# Patient Record
Sex: FEMALE | Race: BLACK OR AFRICAN AMERICAN | URBAN - METROPOLITAN AREA
[De-identification: names, ages, dates, MRNs, and addresses within clinical notes are randomized per-mention and may not be internally consistent; named-entity substitution may affect disease eponyms.]

---

## 2023-05-11 ENCOUNTER — OFFICE VISIT (OUTPATIENT)
Dept: OBGYN CLINIC | Age: 18
End: 2023-05-11

## 2023-05-11 VITALS
DIASTOLIC BLOOD PRESSURE: 80 MMHG | WEIGHT: 293 LBS | SYSTOLIC BLOOD PRESSURE: 132 MMHG | BODY MASS INDEX: 39.68 KG/M2 | HEIGHT: 72 IN

## 2023-05-11 DIAGNOSIS — L83 ACANTHOSIS NIGRICANS: ICD-10-CM

## 2023-05-11 DIAGNOSIS — L68.0 HIRSUTISM: ICD-10-CM

## 2023-05-11 DIAGNOSIS — N91.3 PRIMARY OLIGOMENORRHEA: ICD-10-CM

## 2023-05-11 DIAGNOSIS — N91.3 PRIMARY OLIGOMENORRHEA: Primary | ICD-10-CM

## 2023-05-11 DIAGNOSIS — N93.9 ABNORMAL UTERINE BLEEDING (AUB): ICD-10-CM

## 2023-05-11 LAB
PROLACTIN SERPL-MCNC: 16.6 NG/ML
TSH W FREE THYROID IF ABNORMAL: 2.22 UIU/ML (ref 0.36–3.74)

## 2023-05-11 PROCEDURE — 99204 OFFICE O/P NEW MOD 45 MIN: CPT | Performed by: OBSTETRICS & GYNECOLOGY

## 2023-05-11 ASSESSMENT — PATIENT HEALTH QUESTIONNAIRE - PHQ9
SUM OF ALL RESPONSES TO PHQ QUESTIONS 1-9: 0
2. FEELING DOWN, DEPRESSED OR HOPELESS: 0
SUM OF ALL RESPONSES TO PHQ QUESTIONS 1-9: 0
1. LITTLE INTEREST OR PLEASURE IN DOING THINGS: 0
SUM OF ALL RESPONSES TO PHQ9 QUESTIONS 1 & 2: 0

## 2023-05-11 NOTE — PROGRESS NOTES
[facility]  [address], [address], [address] [facility] [place]    Ana Quiroz MD, Angely Freitas MD, FACOG  BEN Berrios-BC  Assessment/Plan     Inder Puentes was seen today for new patient and consultation. Diagnoses and all orders for this visit:    Primary oligomenorrhea  Comments:  - possible she has PCOS but she is now on treatment, harder to check all labs as FSH/E2 impacted by OCPs  - discussed criteria and implication of PCOS  - will check appropriate labs and do US. Discussed recommendations for a pelvic US. Orders:  -     Prolactin; Future  -     TSH with Reflex; Future  -     Testosterone, free, total; Future  -     DHEA-Sulfate; Future  -     17-OH Progesterone, LC/MS; Future  -     desogestrel-ethinyl estradiol (APRI) 0.15-30 MG-MCG per tablet; Take 1 tablet by mouth daily    Hirsutism  Comments:  - check androgen levels  -may be related to PCOS  Orders:  -     Testosterone, free, total; Future  -     DHEA-Sulfate; Future  -     17-OH Progesterone, LC/MS; Future    Acanthosis nigricans  Comments:  - will check A1C to screen for DM  Orders:  -     Hemoglobin A1C; Future    Abnormal uterine bleeding (AUB)  Comments:  - currently prolonged bleeding with OCPs  - will change pill  - checking US and labs as above            Subjective     Kathryn Shields 21 y.o. Lisette Rom presents today for a gyn problem of who presents for concerns about possible PCOS. New patient. Med hx c/b BMI 37. She has been on Sprintec for 1.5 years. She has a grandmother with PCOS and has concerns she may also have PCOS. Has darkened skin on her neck and cheeks. Needs to wax her neck. Prior to OCPs would skip a period every couple of months, typically 9 a year. Periods were painful, would sometimes pass out. Has trouble loosing weight. No abnormal discharge. No breast complaints. Denies constipation/diarrhea or nipple discharge.  Lost 30 lbs 1 year ago and then tried to continue to loose weight

## 2023-05-11 NOTE — PROGRESS NOTES
New patient here for discuss PCOS concerns. Patient states she has been experiencing weight gains, possible hirsutism, darkening of the skin under the neck and behind the neck, as well as irregular periods. LAST PAP:  never     LAST MAMMO:  never     LMP:  Patient's last menstrual period was 04/29/2023 (exact date).     BIRTH CONTROL:  OCP (estrogen/progesterone)    TOBACCO USE:  No    FAMILY HISTORY OF:   Breast Cancer:  No   Ovarian Cancer:  No   Uterine Cancer:  No   Colon Cancer:  No    Vitals:    05/11/23 0959   Weight: 293 lb (132.9 kg)   Height: 6' 2\" (1.88 m)        Clementina Mike RN  05/11/23  10:06 AM

## 2023-05-12 LAB
EST. AVERAGE GLUCOSE BLD GHB EST-MCNC: 105 MG/DL
HBA1C MFR BLD: 5.3 % (ref 4.8–5.6)

## 2023-05-12 RX ORDER — DESOGESTREL AND ETHINYL ESTRADIOL 0.15-0.03
1 KIT ORAL DAILY
Qty: 1 PACKET | Refills: 3 | Status: SHIPPED | OUTPATIENT
Start: 2023-05-12

## 2023-05-13 LAB
DHEA-S SERPL-MCNC: 254 UG/DL (ref 110–433.2)
TESTOST FREE SERPL-MCNC: 2.6 PG/ML
TESTOST SERPL-MCNC: 69 NG/DL (ref 13–71)

## 2023-05-16 LAB — 17OHP SERPL-MCNC: 85 NG/DL

## 2023-06-20 ENCOUNTER — OFFICE VISIT (OUTPATIENT)
Dept: OBGYN CLINIC | Age: 18
End: 2023-06-20
Payer: COMMERCIAL

## 2023-06-20 VITALS
WEIGHT: 293 LBS | HEIGHT: 72 IN | BODY MASS INDEX: 39.68 KG/M2 | SYSTOLIC BLOOD PRESSURE: 124 MMHG | DIASTOLIC BLOOD PRESSURE: 82 MMHG

## 2023-06-20 DIAGNOSIS — N93.9 ABNORMAL UTERINE BLEEDING (AUB): ICD-10-CM

## 2023-06-20 DIAGNOSIS — L83 ACANTHOSIS NIGRICANS: ICD-10-CM

## 2023-06-20 DIAGNOSIS — N91.3 PRIMARY OLIGOMENORRHEA: ICD-10-CM

## 2023-06-20 DIAGNOSIS — E28.2 PCOS (POLYCYSTIC OVARIAN SYNDROME): Primary | ICD-10-CM

## 2023-06-20 DIAGNOSIS — L68.0 HIRSUTISM: ICD-10-CM

## 2023-06-20 DIAGNOSIS — Z30.41 SURVEILLANCE OF CONTRACEPTIVE PILL: ICD-10-CM

## 2023-06-20 PROCEDURE — 99214 OFFICE O/P EST MOD 30 MIN: CPT | Performed by: OBSTETRICS & GYNECOLOGY

## 2023-06-20 PROCEDURE — 76830 TRANSVAGINAL US NON-OB: CPT | Performed by: OBSTETRICS & GYNECOLOGY

## 2023-06-20 RX ORDER — DESOGESTREL AND ETHINYL ESTRADIOL 0.15-0.03
1 KIT ORAL DAILY
Qty: 1 PACKET | Refills: 11 | Status: SHIPPED | OUTPATIENT
Start: 2023-06-20

## 2023-06-20 SDOH — ECONOMIC STABILITY: FOOD INSECURITY: WITHIN THE PAST 12 MONTHS, YOU WORRIED THAT YOUR FOOD WOULD RUN OUT BEFORE YOU GOT MONEY TO BUY MORE.: NEVER TRUE

## 2023-06-20 SDOH — ECONOMIC STABILITY: HOUSING INSECURITY
IN THE LAST 12 MONTHS, WAS THERE A TIME WHEN YOU DID NOT HAVE A STEADY PLACE TO SLEEP OR SLEPT IN A SHELTER (INCLUDING NOW)?: NO

## 2023-06-20 SDOH — ECONOMIC STABILITY: FOOD INSECURITY: WITHIN THE PAST 12 MONTHS, THE FOOD YOU BOUGHT JUST DIDN'T LAST AND YOU DIDN'T HAVE MONEY TO GET MORE.: NEVER TRUE

## 2023-06-20 SDOH — ECONOMIC STABILITY: INCOME INSECURITY: HOW HARD IS IT FOR YOU TO PAY FOR THE VERY BASICS LIKE FOOD, HOUSING, MEDICAL CARE, AND HEATING?: NOT HARD AT ALL

## 2023-06-20 ASSESSMENT — PATIENT HEALTH QUESTIONNAIRE - PHQ9
2. FEELING DOWN, DEPRESSED OR HOPELESS: 0
SUM OF ALL RESPONSES TO PHQ QUESTIONS 1-9: 1
1. LITTLE INTEREST OR PLEASURE IN DOING THINGS: 1
SUM OF ALL RESPONSES TO PHQ QUESTIONS 1-9: 1
SUM OF ALL RESPONSES TO PHQ9 QUESTIONS 1 & 2: 1
SUM OF ALL RESPONSES TO PHQ QUESTIONS 1-9: 1
SUM OF ALL RESPONSES TO PHQ QUESTIONS 1-9: 1

## 2023-06-20 NOTE — PROGRESS NOTES
Patient here for gyn f/u with US for primary oligomenorrhea. LAST PAP:  never due to age     LAST MAMMO:  never due to age     LMP:  Patient's last menstrual period was 06/07/2023 (exact date).     BIRTH CONTROL:  OCP (estrogen/progesterone)    TOBACCO USE:  No    FAMILY HISTORY OF:   Breast Cancer:  No   Ovarian Cancer:  No   Uterine Cancer:  No   Colon Cancer:  No    Vitals:    06/20/23 0946   BP: 124/82   Site: Left Upper Arm   Position: Sitting   Weight: 294 lb (133.4 kg)   Height: 6' 2\" (1.88 m)        Gita Ramirez RN  06/20/23  9:52 AM
Formerly [employer], criminal justice degree. Social Determinants of Health     Financial Resource Strain: Low Risk     Difficulty of Paying Living Expenses: Not hard at all   Food Insecurity: No Food Insecurity    Worried About [address] in the Last Year: Never true    [facility] in the Last Year: Never true   Transportation Needs: Unknown    Lack of Transportation (Medical): Not on file    Lack of Transportation (Non-Medical):  No   Physical Activity: Not on file   Stress: Not on file   Social Connections: Not on file   Intimate Partner Violence: Not on file   Housing Stability: Unknown    Unable to Pay for Housing in the Last Year: Not on file    Number of Places Lived in the Last Year: Not on file    Unstable Housing in the Last Year: No           No Known Allergies      Review of Systems    Constitutional:  No fevers or chills    Neuro:  No headaches, seizure activity    HEENT: no visual changes, bleeding gums    CV: No chest pain or palpitations    Resp: No SOB or cough    Breast:  No masses, pain, D/C, bleeding    GI:  No nausea/vomiting/diarrhea/constipation    : No dysuria or hematuria    MS:  No back, joint pain    Gyn:  As per HPI    Psych:  No depression, anxiety      Objective       Vitals:    06/20/23 0946   BP: 124/82         Physical Exam    General:  well developed, well nourished, in no acute distress     Head:   normocephalic and atraumatic     Msk:   no deformity or scoliosis noted with normal posture and gait     Skin:   intact without lesions or rashes     Psych:  alert and cooperative; normal mood and affect; normal attention span and concentration        Roseline Caldwell MD   12:39 PM  06/20/23

## 2023-09-26 ENCOUNTER — TELEPHONE (OUTPATIENT)
Dept: OBGYN CLINIC | Age: 18
End: 2023-09-26

## 2023-09-26 NOTE — TELEPHONE ENCOUNTER
Dr. Dalila Vines,     Patient is making sure that lack of menses on [facility] is therapeutic for her.

## 2023-09-27 NOTE — TELEPHONE ENCOUNTER
Messaged patient via [employer], patient has not read message yet. Called patient, no answer, LVM with no details given.

## 2023-09-27 NOTE — TELEPHONE ENCOUNTER
As long as she is not missing pills and is sexually active (could potentially be pregnant) it is ok to not have a period when you are on birth control pills. The pill keeps the uterine lining thin so that it sometimes does not need to shed and therefore you dont have a period. If she is worried about pregnancy, she can take a home test or come in for a UPT.

## 2023-09-29 NOTE — TELEPHONE ENCOUNTER
Called patient, reviewed recommendations/education with patient. Patient verbalized understanding and stated that her menses actually came on today.

## 2024-06-23 DIAGNOSIS — E28.2 PCOS (POLYCYSTIC OVARIAN SYNDROME): ICD-10-CM

## 2024-06-23 DIAGNOSIS — N91.3 PRIMARY OLIGOMENORRHEA: ICD-10-CM

## 2024-06-23 DIAGNOSIS — Z30.41 SURVEILLANCE OF CONTRACEPTIVE PILL: ICD-10-CM

## 2024-06-24 RX ORDER — DESOGESTREL AND ETHINYL ESTRADIOL 0.15-0.03
1 KIT ORAL DAILY
Qty: 1 PACKET | Refills: 11 | OUTPATIENT
Start: 2024-06-24

## 2024-08-21 ENCOUNTER — OFFICE VISIT (OUTPATIENT)
Dept: OBGYN CLINIC | Age: 19
End: 2024-08-21
Payer: COMMERCIAL

## 2024-08-21 VITALS
WEIGHT: 293 LBS | BODY MASS INDEX: 39.68 KG/M2 | HEIGHT: 72 IN | SYSTOLIC BLOOD PRESSURE: 130 MMHG | DIASTOLIC BLOOD PRESSURE: 84 MMHG

## 2024-08-21 DIAGNOSIS — Z76.89 ENCOUNTER TO ESTABLISH CARE WITH NEW DOCTOR: ICD-10-CM

## 2024-08-21 DIAGNOSIS — Z30.41 SURVEILLANCE OF CONTRACEPTIVE PILL: ICD-10-CM

## 2024-08-21 DIAGNOSIS — Z11.3 ROUTINE SCREENING FOR STI (SEXUALLY TRANSMITTED INFECTION): ICD-10-CM

## 2024-08-21 DIAGNOSIS — Z01.411 ENCOUNTER FOR GYNECOLOGICAL EXAMINATION (GENERAL) (ROUTINE) WITH ABNORMAL FINDINGS: Primary | ICD-10-CM

## 2024-08-21 DIAGNOSIS — Z12.4 ENCOUNTER FOR PAPANICOLAOU SMEAR FOR CERVICAL CANCER SCREENING: ICD-10-CM

## 2024-08-21 DIAGNOSIS — E28.2 PCOS (POLYCYSTIC OVARIAN SYNDROME): ICD-10-CM

## 2024-08-21 PROCEDURE — 99459 PELVIC EXAMINATION: CPT | Performed by: OBSTETRICS & GYNECOLOGY

## 2024-08-21 PROCEDURE — 99395 PREV VISIT EST AGE 18-39: CPT | Performed by: OBSTETRICS & GYNECOLOGY

## 2024-08-21 RX ORDER — DESOGESTREL AND ETHINYL ESTRADIOL 0.15-0.03
1 KIT ORAL DAILY
Qty: 3 PACKET | Refills: 4 | Status: SHIPPED | OUTPATIENT
Start: 2024-08-21

## 2024-08-21 ASSESSMENT — PATIENT HEALTH QUESTIONNAIRE - PHQ9
2. FEELING DOWN, DEPRESSED OR HOPELESS: NOT AT ALL
SUM OF ALL RESPONSES TO PHQ QUESTIONS 1-9: 0
SUM OF ALL RESPONSES TO PHQ9 QUESTIONS 1 & 2: 0
SUM OF ALL RESPONSES TO PHQ QUESTIONS 1-9: 0
1. LITTLE INTEREST OR PLEASURE IN DOING THINGS: NOT AT ALL

## 2024-08-21 NOTE — PROGRESS NOTES
Chaperone for Intimate Exam     Chaperone was offer accepted as part of the rooming process    Chaperone: Shani Driver

## 2024-08-21 NOTE — PROGRESS NOTES
Pt comes in today for AE and BC refill. Pt states she had 3 periods June. Pt states bleeding was very heavy every time.   June 6-9 June 16-25 June 30- July 8  Pt has not been sexually active since starting birth control.     LAST PAP:  never     LAST MAMMO:  never      LMP:  Patient's last menstrual period was 06/30/2024 (exact date).    BIRTH CONTROL:  OCP (estrogen/progesterone)    TOBACCO USE:  No    FAMILY HISTORY OF:   Breast Cancer:  No   Ovarian Cancer:  No   Uterine Cancer:  No   Colon Cancer:  No    Vitals:    08/21/24 1349   BP: 130/84   Site: Left Upper Arm   Position: Sitting   Weight: (!) 142.4 kg (314 lb)   Height: 1.88 m (6' 2\")        Malka Goldberg MA  08/21/24  1:57 PM

## 2024-08-21 NOTE — PROGRESS NOTES
[facility]  [address], [place] [address]  [phone]    Mayda Adamson MD, FACOG  Chad Kelly MD, FACOG  BEN Garcia-BC    Assessment/Plan     Kathryn was seen today for annual exam and medication refill.    Diagnoses and all orders for this visit:    Encounter for gynecological examination (general) (routine) with abnormal findings  Comments:  - pap done  - self breast awareness encouraged  - desries establishing with PCP, referral sent    PCOS (polycystic ovarian syndrome)  Comments:  - meets rotterdam criteria (oligo, PCO ovaries, hirsutism)  - labs normal, testosterone high normal  - likes Apri OCPs, Rx given  Orders:  -     desogestrel-ethinyl estradiol (APRI) 0.15-30 MG-MCG per tablet; Take 1 tablet by mouth daily    Surveillance of contraceptive pill  Comments:  - likes current pill  - irregular bleeding in , normal since  - RF given. Further w/u if AUB returns  Orders:  -     desogestrel-ethinyl estradiol (APRI) 0.15-30 MG-MCG per tablet; Take 1 tablet by mouth daily    Encounter for Papanicolaou smear for cervical cancer screening  -     PAP IG, CT-NG-TV, rfx Aptima HPV ASCUS (1993); Future  -     PAP IG, CT-NG-TV, rfx Aptima HPV ASCUS (1993)    Encounter to establish care with new doctor  Comments:  - would like referral to establish with PCP  Orders:  -     [facility] - Marilia Doll, [place]    Routine screening for STI (sexually transmitted infection)  Comments:  - cultures done with pap per request  Orders:  -     PAP IG, CT-NG-TV, rfx Aptima HPV ASCUS (1993); Future  -     PAP IG, CT-NG-TV, rfx Aptima HPV ASCUS (1993)    BMI 40.0-44.9, adult (HCC)  Comments:  - healthy diet and exercise encouraged  - discussed insulin resistance can be barrier to PCOS. DIscussed inositol and metformin which can aid  - diet discused           Subjective     Kathryn Flores  19 y.o.  presents today for annual Gyn exam.  H/o PCOS (has  hirsutism, acanthosis nigricans, irregular menses, PCO ovaries on US). Was on Sprintec, changed to Apri 2023. Normal A1C. Has liked Apri much better but had irregular bleeding in . July and August normal. Had 3 episodes in  (- ; -, and -. Has not missed any pills. Period was heavy x 2 days then light/spotting. Thinks due to stress. She had spotting last week, no full period. No abnormal discharge. No breast concerns.  Normal BM, no urinary issues. Not currently sexually active.     Unhappy with weight gain. Walks 45 min - 1 hr. She only eats once a day. 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     ROUTINE HEALTH MAINTENANCE:   Last pap: never   - history of abnormal paps: n/a   - history of cervical procedures: n/a  Last mammogram: n/a  Last colonoscopy: n/a  Last DEXA: n/a  Gardasil vaccine: ??    GYN HISTORY:   LMP: Patient's last menstrual period was 2024 (exact date).  Menarche:10  Periods are: regular aside from   Duration of bleedin-5d  Intermenstrual bleeding: episode in , normal since  Menopause: np  Taking hormone replacement therapy: n/a    SEXUAL HISTORY:   Sexually active: not currently  Current contraception: abstinence and OCP (estrogen/progesterone)  Problems with intercourse:  no pain/bleeding with sex  History of STIs: no      OB History    Para Term  AB Living   0 0 0 0 0 0   SAB IAB Ectopic Molar Multiple Live Births   0 0 0 0 0 0           Past Medical History:   Diagnosis Date    Anemia     PCOS (polycystic ovarian syndrome)             History reviewed. No pertinent surgical history.        History reviewed. No pertinent family history.        Social History     Socioeconomic History    Marital status: Unknown     Spouse name: Not on file    Number of children: Not on file    Years of education: Not on file    Highest education level: Not on file   Occupational History    Not on file   Tobacco

## 2024-08-27 LAB
C TRACH RRNA CVX QL NAA+PROBE: POSITIVE
COLLECTION METHOD: ABNORMAL
CYTOLOGIST CVX/VAG CYTO: ABNORMAL
CYTOLOGY CVX/VAG DOC THIN PREP: ABNORMAL
DATE OF LMP: ABNORMAL
HPV REFLEX: ABNORMAL
Lab: ABNORMAL
N GONORRHOEA RRNA CVX QL NAA+PROBE: NEGATIVE
PAP SOURCE: ABNORMAL
PATH REPORT.FINAL DX SPEC: ABNORMAL
STAT OF ADQ CVX/VAG CYTO-IMP: ABNORMAL
T VAGINALIS RRNA SPEC QL NAA+PROBE: NEGATIVE

## 2024-08-28 ENCOUNTER — TELEPHONE (OUTPATIENT)
Dept: OBGYN CLINIC | Age: 19
End: 2024-08-28

## 2024-08-28 DIAGNOSIS — A74.9 CHLAMYDIA: Primary | ICD-10-CM

## 2024-08-28 RX ORDER — DOXYCYCLINE 100 MG/1
100 CAPSULE ORAL 2 TIMES DAILY
Qty: 14 CAPSULE | Refills: 0 | Status: SHIPPED | OUTPATIENT
Start: 2024-08-28 | End: 2024-09-04

## 2024-08-28 NOTE — TELEPHONE ENCOUNTER
Called patient, reviewed normal pap but positive for chlamydia.   Stated that doxy was sent into her pharmacy to tx this STD and that we will need to her to come back in 3 months to re-swab and make sure the infection is gone.   Patient verbalized understanding, 3 mo KARLEE scheduled for 11/26/2024.    Patient knows to abstain from intercourse during tx and at least 7 days after tx.   Encouraged use of condoms moving forward to reduce risk of re-infection.  Patient verbalizes understanding and has notified her partner, partner has already been treated but she does not intend to continue her relationship with him.     Patient knows to call or message office with any other thoughts, questions, and/or concerns.

## 2024-08-28 NOTE — TELEPHONE ENCOUNTER
Please let Kathryn know her pap was normal. STI testing was positive for chlamydia. I'm calling in an antibiotic. Her partner should also be treated. Abstain from sex until 1 week after treatment completed. Needs KARLEE in 3 months, please help her to schedule. Thanks

## 2024-08-29 ENCOUNTER — TELEPHONE (OUTPATIENT)
Dept: OBGYN CLINIC | Age: 19
End: 2024-08-29

## 2024-08-29 RX ORDER — ONDANSETRON 4 MG/1
4 TABLET, ORALLY DISINTEGRATING ORAL EVERY 12 HOURS PRN
Qty: 20 TABLET | Refills: 0 | Status: SHIPPED | OUTPATIENT
Start: 2024-08-29

## 2024-08-29 NOTE — TELEPHONE ENCOUNTER
Updated patient via [employer] that zofran rx was sent in and for her to take that an hour for the doxy.

## 2024-08-29 NOTE — TELEPHONE ENCOUNTER
I'll send in some zofran.     If she still has trouble we can try azithro but there has been more resistance to that medication so its no longer recommended as first line by the CDC.

## 2024-09-07 ENCOUNTER — APPOINTMENT (OUTPATIENT)
Dept: GENERAL RADIOLOGY | Age: 19
End: 2024-09-07
Payer: COMMERCIAL

## 2024-09-07 ENCOUNTER — HOSPITAL ENCOUNTER (EMERGENCY)
Age: 19
Discharge: HOME | End: 2024-09-07
Attending: GENERAL PRACTICE
Payer: COMMERCIAL

## 2024-09-07 VITALS
OXYGEN SATURATION: 94 % | HEART RATE: 119 BPM | WEIGHT: 220 LBS | BODY MASS INDEX: 29.8 KG/M2 | SYSTOLIC BLOOD PRESSURE: 152 MMHG | DIASTOLIC BLOOD PRESSURE: 99 MMHG | RESPIRATION RATE: 20 BRPM | TEMPERATURE: 98.5 F | HEIGHT: 72 IN

## 2024-09-07 DIAGNOSIS — K56.41 FECAL IMPACTION IN RECTUM (HCC): Primary | ICD-10-CM

## 2024-09-07 LAB
ALBUMIN SERPL-MCNC: 3.8 G/DL (ref 3.5–5)
ALBUMIN/GLOB SERPL: 1.2 (ref 1–1.9)
ALP SERPL-CCNC: 96 U/L (ref 35–104)
ALT SERPL-CCNC: 19 U/L (ref 12–65)
ANION GAP SERPL CALC-SCNC: 12 MMOL/L (ref 9–18)
AST SERPL-CCNC: 18 U/L (ref 15–37)
BASOPHILS # BLD: 0.1 K/UL (ref 0–0.2)
BASOPHILS NFR BLD: 1 % (ref 0–2)
BILIRUB SERPL-MCNC: 0.5 MG/DL (ref 0–1.2)
BUN SERPL-MCNC: 7 MG/DL (ref 6–23)
CALCIUM SERPL-MCNC: 9.3 MG/DL (ref 8.8–10.2)
CHLORIDE SERPL-SCNC: 105 MMOL/L (ref 98–107)
CO2 SERPL-SCNC: 23 MMOL/L (ref 20–28)
CREAT SERPL-MCNC: 0.7 MG/DL (ref 0.6–1.1)
DIFFERENTIAL METHOD BLD: ABNORMAL
EOSINOPHIL # BLD: 0.2 K/UL (ref 0–0.8)
EOSINOPHIL NFR BLD: 2 % (ref 0.5–7.8)
ERYTHROCYTE [DISTWIDTH] IN BLOOD BY AUTOMATED COUNT: 13 % (ref 11.9–14.6)
GLOBULIN SER CALC-MCNC: 3.2 G/DL (ref 2.3–3.5)
GLUCOSE SERPL-MCNC: 138 MG/DL (ref 70–99)
HCT VFR BLD AUTO: 41.5 % (ref 35.8–46.3)
HGB BLD-MCNC: 13.6 G/DL (ref 11.7–15.4)
IMM GRANULOCYTES # BLD AUTO: 0 K/UL (ref 0–0.5)
IMM GRANULOCYTES NFR BLD AUTO: 0 % (ref 0–5)
LIPASE SERPL-CCNC: 78 U/L (ref 13–60)
LYMPHOCYTES # BLD: 1.7 K/UL (ref 0.5–4.6)
LYMPHOCYTES NFR BLD: 16 % (ref 13–44)
MCH RBC QN AUTO: 29.4 PG (ref 26.1–32.9)
MCHC RBC AUTO-ENTMCNC: 32.8 G/DL (ref 31.4–35)
MCV RBC AUTO: 89.8 FL (ref 82–102)
MONOCYTES # BLD: 0.8 K/UL (ref 0.1–1.3)
MONOCYTES NFR BLD: 7 % (ref 4–12)
NEUTS SEG # BLD: 8.3 K/UL (ref 1.7–8.2)
NEUTS SEG NFR BLD: 75 % (ref 43–78)
NRBC # BLD: 0 K/UL (ref 0–0.2)
PLATELET # BLD AUTO: 282 K/UL (ref 150–450)
PMV BLD AUTO: 10.7 FL (ref 9.4–12.3)
POTASSIUM SERPL-SCNC: 4.1 MMOL/L (ref 3.5–5.1)
PROT SERPL-MCNC: 7 G/DL (ref 6.3–8.2)
RBC # BLD AUTO: 4.62 M/UL (ref 4.05–5.2)
SODIUM SERPL-SCNC: 140 MMOL/L (ref 136–145)
WBC # BLD AUTO: 11 K/UL (ref 4.3–11.1)

## 2024-09-07 PROCEDURE — 99284 EMERGENCY DEPT VISIT MOD MDM: CPT

## 2024-09-07 PROCEDURE — 85025 COMPLETE CBC W/AUTO DIFF WBC: CPT

## 2024-09-07 PROCEDURE — 6370000000 HC RX 637 (ALT 250 FOR IP): Performed by: GENERAL PRACTICE

## 2024-09-07 PROCEDURE — 83690 ASSAY OF LIPASE: CPT

## 2024-09-07 PROCEDURE — 80053 COMPREHEN METABOLIC PANEL: CPT

## 2024-09-07 PROCEDURE — 74018 RADEX ABDOMEN 1 VIEW: CPT

## 2024-09-07 RX ADMIN — SODIUM PHOSPHATE 1 ENEMA: 7; 19 ENEMA RECTAL at 21:28

## 2024-09-07 ASSESSMENT — PAIN - FUNCTIONAL ASSESSMENT: PAIN_FUNCTIONAL_ASSESSMENT: 0-10

## 2024-09-07 ASSESSMENT — LIFESTYLE VARIABLES
HOW OFTEN DO YOU HAVE A DRINK CONTAINING ALCOHOL: NEVER
HOW MANY STANDARD DRINKS CONTAINING ALCOHOL DO YOU HAVE ON A TYPICAL DAY: PATIENT DOES NOT DRINK

## 2024-09-07 ASSESSMENT — PAIN SCALES - GENERAL: PAINLEVEL_OUTOF10: 10

## 2024-09-07 ASSESSMENT — PAIN DESCRIPTION - LOCATION: LOCATION: RECTUM;ABDOMEN

## 2024-09-07 NOTE — ED TRIAGE NOTES
Pt ambulated to triage    Pt states she has been constipated for the past 5 days.  Pt has taken otc mediations, stool softeners, suppositories, and magnesium with no relief.  No hx of constipation.  No precipitating event or abnormal ingestion of food.  No dysuria, chest pain, or sob.

## 2024-09-08 NOTE — DISCHARGE INSTRUCTIONS
I suggest using MiraLAX and rectal suppositories.  You can also get enemas over-the-counter.  Please return for any worsening symptoms.

## 2024-09-08 NOTE — ED PROVIDER NOTES
Transportation Needs: Unknown (6/20/2023)    PRAPARE - Transportation     Lack of Transportation (Non-Medical): No   Housing Stability: Unknown (6/20/2023)    Housing Stability Vital Sign     Unstable Housing in the Last Year: No        Discharge Medication List as of 9/7/2024  9:54 PM        CONTINUE these medications which have NOT CHANGED    Details   ondansetron (ZOFRAN-ODT) 4 MG disintegrating tablet Take 1 tablet by mouth every 12 hours as needed for Nausea or Vomiting, Disp-20 tablet, R-0Normal      desogestrel-ethinyl estradiol (APRI) 0.15-30 MG-MCG per tablet Take 1 tablet by mouth daily, Disp-3 packet, R-4Normal              Results for orders placed or performed during the hospital encounter of 09/07/24   XR ABDOMEN (KUB) (SINGLE AP VIEW)    Narrative    KUB    INDICATION:   Constipation    COMPARISON:  None    TECHNIQUE: Supine views of the abdomen were obtained.    FINDINGS: Dense rectal stool. There is no evidence of obstruction.  No renal  calculi are seen. Lung bases are clear.      Impression    Dense rectal stool which can suggest constipation.    Electronically signed by Carter Lynn   CBC with Auto Differential   Result Value Ref Range    WBC 11.0 4.3 - 11.1 K/uL    RBC 4.62 4.05 - 5.2 M/uL    Hemoglobin 13.6 11.7 - 15.4 g/dL    Hematocrit 41.5 35.8 - 46.3 %    MCV 89.8 82.0 - 102.0 FL    MCH 29.4 26.1 - 32.9 PG    MCHC 32.8 31.4 - 35.0 g/dL    RDW 13.0 11.9 - 14.6 %    Platelets 282 150 - 450 K/uL    MPV 10.7 9.4 - 12.3 FL    nRBC 0.00 0.0 - 0.2 K/uL    Differential Type AUTOMATED      Neutrophils % 75 43 - 78 %    Lymphocytes % 16 13 - 44 %    Monocytes % 7 4.0 - 12.0 %    Eosinophils % 2 0.5 - 7.8 %    Basophils % 1 0.0 - 2.0 %    Immature Granulocytes % 0 0.0 - 5.0 %    Neutrophils Absolute 8.3 (H) 1.7 - 8.2 K/UL    Lymphocytes Absolute 1.7 0.5 - 4.6 K/UL    Monocytes Absolute 0.8 0.1 - 1.3 K/UL    Eosinophils Absolute 0.2 0.0 - 0.8 K/UL    Basophils Absolute 0.1 0.0 - 0.2 K/UL

## 2024-11-26 ENCOUNTER — OFFICE VISIT (OUTPATIENT)
Dept: OBGYN CLINIC | Age: 19
End: 2024-11-26
Payer: COMMERCIAL

## 2024-11-26 VITALS
BODY MASS INDEX: 39.68 KG/M2 | HEIGHT: 72 IN | SYSTOLIC BLOOD PRESSURE: 130 MMHG | DIASTOLIC BLOOD PRESSURE: 82 MMHG | WEIGHT: 293 LBS

## 2024-11-26 DIAGNOSIS — Z11.3 ROUTINE SCREENING FOR STI (SEXUALLY TRANSMITTED INFECTION): ICD-10-CM

## 2024-11-26 DIAGNOSIS — Z86.19 HISTORY OF CHLAMYDIA INFECTION: Primary | ICD-10-CM

## 2024-11-26 PROCEDURE — 99213 OFFICE O/P EST LOW 20 MIN: CPT | Performed by: OBSTETRICS & GYNECOLOGY

## 2024-11-26 NOTE — PROGRESS NOTES
Pt comes in today for 3 month KARLEE.     LAST PAP:  08/22/2024 neg     LAST MAMMO:  never     LMP:  Patient's last menstrual period was 10/16/2024 (exact date).    BIRTH CONTROL:  OCP (estrogen/progesterone)    TOBACCO USE:  No    FAMILY HISTORY OF:   Breast Cancer:  No   Ovarian Cancer:  No   Uterine Cancer:  No   Colon Cancer:  No    Vitals:    11/26/24 1132   BP: 130/82   Site: Left Upper Arm   Position: Sitting   Weight: (!) 142.4 kg (314 lb)   Height: 1.88 m (6' 2\")        Malka Goldberg MA  11/26/24  11:41 AM

## 2024-11-26 NOTE — PROGRESS NOTES
Chaperone for Intimate Exam     Chaperone was offer accepted as part of the rooming process    Chaperone: Jayashree Ibarra

## 2024-11-26 NOTE — PROGRESS NOTES
[facility]  [address], [place] [address]  [phone]    Mayda Adamson MD, FACOG  Chad Kelly MD, FACOG  BEN Garcia-BC  Assessment/Plan     Kathryn was seen today for follow-up.    Diagnoses and all orders for this visit:    History of chlamydia infection  -     Chlamydia, Gonorrhea, Trichomoniasis; Future  -     Chlamydia, Gonorrhea, Trichomoniasis    Routine screening for STI (sexually transmitted infection)  -     Chlamydia, Gonorrhea, Trichomoniasis; Future  -     Chlamydia, Gonorrhea, Trichomoniasis           Subjective     Kathryn Flores 19 y.o.  presents today for a gyn problem of h/o chlamydia infection, presenting for KARLEE. She and her partner were both treated. Last seen 24 and pap was normal but STI screening + for CT. She denies any abnormal discharge, odor/itching/burning today.     Going to [facility] for thanksgiving.       LAST PAP: 24    LAST MAMMO: n/a    LMP: Patient's last menstrual period was 10/16/2024 (exact date).    BIRTH CONTROL: OCP (estrogen/progesterone)      OB History    Para Term  AB Living   0 0 0 0 0 0   SAB IAB Ectopic Molar Multiple Live Births   0 0 0 0 0 0         Past Medical History:   Diagnosis Date    Anemia     Chlamydia     PCOS (polycystic ovarian syndrome)           History reviewed. No pertinent surgical history.      History reviewed. No pertinent family history.      Social History     Socioeconomic History    Marital status: Unknown     Spouse name: Not on file    Number of children: Not on file    Years of education: Not on file    Highest education level: Not on file   Occupational History    Not on file   Tobacco Use    Smoking status: Never    Smokeless tobacco: Never   Vaping Use    Vaping status: Never Used   Substance and Sexual Activity    Alcohol use: Yes     Comment: occasional    Drug use: Never    Sexual activity: Not Currently     Birth control/protection: OCP   Other Topics Concern

## 2024-11-28 LAB
C TRACH RRNA SPEC QL NAA+PROBE: NEGATIVE
N GONORRHOEA RRNA SPEC QL NAA+PROBE: NEGATIVE
SPECIMEN SOURCE: NORMAL
T VAGINALIS RRNA SPEC QL NAA+PROBE: NEGATIVE